# Patient Record
Sex: FEMALE
[De-identification: names, ages, dates, MRNs, and addresses within clinical notes are randomized per-mention and may not be internally consistent; named-entity substitution may affect disease eponyms.]

---

## 2023-02-06 ENCOUNTER — NURSE TRIAGE (OUTPATIENT)
Dept: OTHER | Facility: CLINIC | Age: 38
End: 2023-02-06

## 2023-02-06 NOTE — TELEPHONE ENCOUNTER
Location of patient: [facility] MRN: [id number]    Provider: Ceci Berg    Subjective: Caller states \"Caller concern for UTI. States she has some itching. \"     Denies discharge, cloudy urine, fatigue, feeling faint    Current Symptoms: itching      Pain Severity: 0/10; Temperature: denies    What has been tried: nothing      Recommended disposition: Be seen today or Tomorrow    Care advice provided, patient verbalizes understanding; denies any other questions or concerns. Outcome:        This triage is a result of a call to the [facility]    Reason for Disposition   Patient wants to be seen    Protocols used: Urinary Symptoms-ADULT-OH Deep Sutures: 2-0 Vicryl